# Patient Record
Sex: FEMALE | Race: WHITE
[De-identification: names, ages, dates, MRNs, and addresses within clinical notes are randomized per-mention and may not be internally consistent; named-entity substitution may affect disease eponyms.]

---

## 2018-02-20 ENCOUNTER — HOSPITAL ENCOUNTER (EMERGENCY)
Dept: HOSPITAL 80 - FED | Age: 18
Discharge: HOME | End: 2018-02-20
Payer: COMMERCIAL

## 2018-02-20 VITALS
RESPIRATION RATE: 18 BRPM | SYSTOLIC BLOOD PRESSURE: 127 MMHG | HEART RATE: 63 BPM | OXYGEN SATURATION: 100 % | DIASTOLIC BLOOD PRESSURE: 87 MMHG

## 2018-02-20 VITALS — TEMPERATURE: 98.4 F

## 2018-02-20 DIAGNOSIS — R06.00: Primary | ICD-10-CM

## 2018-02-20 DIAGNOSIS — F17.200: ICD-10-CM

## 2018-02-20 LAB — PLATELET # BLD: 346 10^3/UL (ref 150–400)

## 2018-02-20 NOTE — EDPHY
H & P


Stated Complaint: sob, cp


Time Seen by Provider: 02/20/18 14:03


HPI/ROS: 





CHIEF COMPLAINT: "I just can not catch my breath" x 12 hr





HISTORY OF PRESENT ILLNESS:  17-year-old female with self-described history of 

anxiety complaining of dyspnea for the past 12 hr as well as mild pleuritic 

right-sided chest pain after she returned home from work.  Positive birth 

control history.  No trauma.  No syncope or near syncope.  No cigarette use.  

No cocaine use.  No history of coagulopathic disorder.  No immobilization.  No 

malignancy history.  Headache.








REVIEW OF SYSTEMS:


A ten point review of systems was performed and is negative with the exception 

of the items mentioned in the HPI








PAST MEDICAL & SURGICAL  HISTORY:  Self-described anxiety history.





SOCIAL HISTORY:  Nonsmoker.  No drug use.  No cocaine use.











************


PHYSICAL EXAM





(Prior to examination, patient consented to physical exam, hands were washed 

and my usual and customary physical exam procedures followed)


1) GENERAL: Well-developed, well-nourished, alert and oriented.  Appears 

anxious.


2) HEAD: Normocephalic, atraumatic


3) HEENT: Pupils equal, round, reactive to light bilaterally.  Sclera 

anicteric.  Nasopharynx, oropharynx, clear, no lesions. 


4) NECK: Full range of motion, no meningeal signs.


5) LUNGS: Clear auscultation bilaterally, no wheezes, no rhonchi, no 

retractions.   


6) HEART: Regular rate and rhythm, no murmur, no heave, no gallop.


7) ABDOMEN: No guarding, no rebound, no focal tenderness, negative McBurney's, 

negative Dawn's, negative Rovsing's, negative peritoneal sign,


8) MUSCULOSKELETAL: Moving all extremities, no focal areas of tenderness, no 

obvious trauma.  No peripheral edema or discoloration.  Negative Homans no 

palpable cord


9) BACK: No CVA tenderness, no midline vertebral tenderness, no fluctuance, no 

step-off, no obvious trauma, no visual or palpable abnormality. 


10) SKIN: No rash, no petechiae. 


11) Psychiatric:  Patient is oriented X 3, there is no agitation.








***************





DIFFERENTIAL DIAGNOSIS:  In no particular order including but not limited to 

pneumothorax, pulmonary embolus, anxiety








- Personal History


LMP (Females 10-55): 15-21 Days Ago


Current Tetanus/Diphtheria Vaccine: Yes


Current Tetanus Diphtheria and Acellular Pertussis (TDAP): Yes





- Medical/Surgical History


Hx Asthma: No


Hx Chronic Respiratory Disease: No


Hx Diabetes: No


Hx Cardiac Disease: No


Hx Renal Disease: No


Hx Cirrhosis: No


Hx Alcoholism: No


Hx HIV/AIDS: No


Hx Splenectomy or Spleen Trauma: No


Other PMH: Denies





- Social History


Smoking Status: Current some day smoker


Constitutional: 


 Initial Vital Signs











Temperature (C)  36.9 C   02/20/18 13:59


 


Heart Rate  71   02/20/18 13:59


 


Respiratory Rate  16   02/20/18 13:59


 


Blood Pressure  127/78 H  02/20/18 13:59


 


O2 Sat (%)  95   02/20/18 13:59








 











O2 Delivery Mode               Room Air














Allergies/Adverse Reactions: 


 





cefprozil [From Cefzil] Allergy (Verified 02/20/18 13:58)


 








Home Medications: 














 Medication  Instructions  Recorded


 


Bcp  02/20/18


 


LORazepam [Ativan 1 mg (RX)] 1 mg PO Q6 PRN #3 tab 02/20/18














Medical Decision Making





- Diagnostics


Imaging Results: 


 Imaging Impressions





Chest X-Ray  02/20/18 14:09


Impression:  No acute findings in the chest.


 


 








Images reviewed myself


ED Course/Re-evaluation: 





2:10 p.m.:  Will obtain EKG, chest x-ray, check D-dimer and re-evaluate.





3:14 p.m.:  Re-evaluation.  Discussed her imaging results, discussed her normal 

sinus EKG, her normal D-dimer which I think adequately excludes pulmonary 

embolus in this patient whom I have a low to moderate suspicion for pulmonary 

embolus.  I do not think that the benefits of CT imaging outweigh the risks.  

Doubt pulmonary embolus.  Doubt pulmonary infectious etiology.  Doubt MI given 

her lack of risk factors.  Plan will be discharge home.  She requests 

anxiolytics which could be given to her.  Risks of benzodiazepine discussed 

with patient.  She feels comfortable with this plan.  Care of patient under 

supervision of secondary supervising physician Dr Kirk


 . 





- Data Points


Laboratory Results: 


 Laboratory Results





 02/20/18 14:23 





 02/20/18 14:23 





 











  02/20/18 02/20/18 02/20/18





  14:23 14:23 14:23


 


WBC      





    


 


RBC      





    


 


Hgb      





    


 


Hct      





    


 


MCV      





    


 


MCH      





    


 


MCHC      





    


 


RDW      





    


 


Plt Count      





    


 


MPV      





    


 


Neut % (Auto)      





    


 


Lymph % (Auto)      





    


 


Mono % (Auto)      





    


 


Eos % (Auto)      





    


 


Baso % (Auto)      





    


 


Nucleat RBC Rel Count      





    


 


Absolute Neuts (auto)      





    


 


Absolute Lymphs (auto)      





    


 


Absolute Monos (auto)      





    


 


Absolute Eos (auto)      





    


 


Absolute Basos (auto)      





    


 


Absolute Nucleated RBC      





    


 


Immature Gran %      





    


 


Immature Gran #      





    


 


D-Dimer      < 0.27 ug/mLFEU ug/mLFEU





     (0.00-0.50) 


 


Sodium    142 mEq/L mEq/L  





    (135-145)  


 


Potassium    4.0 mEq/L mEq/L  





    (3.5-5.2)  


 


Chloride    106 mEq/L mEq/L  





    ()  


 


Carbon Dioxide    22 mEq/l mEq/l  





    (22-31)  


 


Anion Gap    14 mEq/L mEq/L  





    (8-16)  


 


BUN    12 mg/dL mg/dL  





    (7-23)  


 


Creatinine    0.8 mg/dL mg/dL  





    (0.6-1.0)  


 


Estimated GFR    > 60   





    


 


Glucose    74 mg/dL mg/dL  





    ()  


 


Calcium    9.6 mg/dL mg/dL  





    (8.5-10.4)  


 


Beta HCG, Qual  NEGATIVE     





    














  02/20/18





  14:23


 


WBC  7.16 10^3/uL 10^3/uL





   (3.80-9.50) 


 


RBC  5.07 10^6/uL 10^6/uL





   (4.18-5.33) 


 


Hgb  15.7 g/dL g/dL





   (12.6-16.3) 


 


Hct  44.9 % %





   (38.0-47.0) 


 


MCV  88.6 fL fL





   (81.5-99.8) 


 


MCH  31.0 pg pg





   (27.9-34.1) 


 


MCHC  35.0 g/dL g/dL





   (32.4-36.7) 


 


RDW  11.9 % %





   (11.5-15.2) 


 


Plt Count  346 10^3/uL 10^3/uL





   (150-400) 


 


MPV  9.2 fL fL





   (8.7-11.7) 


 


Neut % (Auto)  55.3 % %





   (39.3-74.2) 


 


Lymph % (Auto)  33.5 % %





   (15.0-45.0) 


 


Mono % (Auto)  8.2 % %





   (4.5-13.0) 


 


Eos % (Auto)  2.1 % %





   (0.6-7.6) 


 


Baso % (Auto)  0.6 % %





   (0.3-1.7) 


 


Nucleat RBC Rel Count  0.0 % %





   (0.0-0.2) 


 


Absolute Neuts (auto)  3.96 10^3/uL 10^3/uL





   (1.70-6.50) 


 


Absolute Lymphs (auto)  2.40 10^3/uL 10^3/uL





   (1.00-3.00) 


 


Absolute Monos (auto)  0.59 10^3/uL 10^3/uL





   (0.30-0.80) 


 


Absolute Eos (auto)  0.15 10^3/uL 10^3/uL





   (0.03-0.40) 


 


Absolute Basos (auto)  0.04 10^3/uL 10^3/uL





   (0.02-0.10) 


 


Absolute Nucleated RBC  0.00 10^3/uL 10^3/uL





   (0-0.01) 


 


Immature Gran %  0.3 % %





   (0.0-1.1) 


 


Immature Gran #  0.02 10^3/uL 10^3/uL





   (0.00-0.10) 


 


D-Dimer  





  


 


Sodium  





  


 


Potassium  





  


 


Chloride  





  


 


Carbon Dioxide  





  


 


Anion Gap  





  


 


BUN  





  


 


Creatinine  





  


 


Estimated GFR  





  


 


Glucose  





  


 


Calcium  





  


 


Beta HCG, Qual  





  














Departure





- Departure


Disposition: Home, Routine, Self-Care


Clinical Impression: 


Dyspnea


Qualifiers:


 Dyspnea type: unspecified Qualified Code(s): R06.00 - Dyspnea, unspecified





Condition: Good


Instructions:  Dyspnea (ED)


Additional Instructions: 


Return to the ER if you develop shortness of breath, chest pain sensation of 

feeling faint or any other symptoms that concern you


Referrals: 


PHAN URIBE [Primary Care Provider] - 1-2 days without fail


Prescriptions: 


LORazepam [Ativan 1 mg (RX)] 1 mg PO Q6 PRN #3 tab


 PRN Reason: Anxiety

## 2018-02-20 NOTE — CPEKG
Heart Rate: 67

RR Interval: 896

P-R Interval: 136

QRSD Interval: 84

QT Interval: 404

QTC Interval: 427

P Axis: 8

QRS Axis: 49

T Wave Axis: -4

EKG Severity - NORMAL ECG -

EKG Impression: SINUS RHYTHM

Electronically Signed By: Fernandez Oliveros 22-Feb-2018 07:19:35

## 2022-08-17 ENCOUNTER — APPOINTMENT (RX ONLY)
Dept: URBAN - METROPOLITAN AREA CLINIC 9 | Facility: CLINIC | Age: 22
Setting detail: DERMATOLOGY
End: 2022-08-17

## 2022-08-17 DIAGNOSIS — B07.8 OTHER VIRAL WARTS: ICD-10-CM | Status: INADEQUATELY CONTROLLED

## 2022-08-17 PROCEDURE — ? COUNSELING

## 2022-08-17 PROCEDURE — ? BENIGN DESTRUCTION

## 2022-08-17 PROCEDURE — 17110 DESTRUCTION B9 LES UP TO 14: CPT

## 2022-08-17 ASSESSMENT — LOCATION DETAILED DESCRIPTION DERM: LOCATION DETAILED: LEFT INDEX FINGERTIP

## 2022-08-17 ASSESSMENT — LOCATION SIMPLE DESCRIPTION DERM: LOCATION SIMPLE: LEFT INDEX FINGER

## 2022-08-17 ASSESSMENT — LOCATION ZONE DERM: LOCATION ZONE: FINGER

## 2022-08-17 NOTE — PROCEDURE: BENIGN DESTRUCTION
Treatment Number (Will Not Render If 0): 1
Include Z78.9 (Other Specified Conditions Influencing Health Status) As An Associated Diagnosis?: No
Consent: The patient's consent was obtained including but not limited to risks of crusting, scabbing, blistering, scarring, darker or lighter pigmentary change, recurrence, incomplete removal and infection.
Duration Of Freeze Thaw-Cycle (Seconds): 5-10
Detail Level: Detailed
Medical Necessity Clause: This procedure was medically necessary because the lesions that were treated were:
Anesthesia Volume In Cc: 0.5
Medical Necessity Information: It is in your best interest to select a reason for this procedure from the list below. All of these items fulfill various CMS LCD requirements except the new and changing color options.
Post-Care Instructions: I reviewed with the patient in detail post-care instructions. Patient is to wear sunprotection, and avoid picking at any of the treated lesions. Pt may apply Vaseline to crusted or scabbing areas.
Number Of Freeze-Thaw Cycles: 3 freeze-thaw cycles

## 2024-01-19 ENCOUNTER — APPOINTMENT (RX ONLY)
Dept: URBAN - METROPOLITAN AREA CLINIC 9 | Facility: CLINIC | Age: 24
Setting detail: DERMATOLOGY
End: 2024-01-19

## 2024-01-19 DIAGNOSIS — L57.8 OTHER SKIN CHANGES DUE TO CHRONIC EXPOSURE TO NONIONIZING RADIATION: ICD-10-CM | Status: STABLE

## 2024-01-19 DIAGNOSIS — D18.0 HEMANGIOMA: ICD-10-CM

## 2024-01-19 DIAGNOSIS — L30.0 NUMMULAR DERMATITIS: ICD-10-CM | Status: INADEQUATELY CONTROLLED

## 2024-01-19 PROBLEM — D18.01 HEMANGIOMA OF SKIN AND SUBCUTANEOUS TISSUE: Status: ACTIVE | Noted: 2024-01-19

## 2024-01-19 PROCEDURE — ? PRESCRIPTION

## 2024-01-19 PROCEDURE — ? COSMETIC CONSULTATION - PULSED-DYE LASER

## 2024-01-19 PROCEDURE — ? FULL BODY SKIN EXAM - DECLINED

## 2024-01-19 PROCEDURE — 99213 OFFICE O/P EST LOW 20 MIN: CPT

## 2024-01-19 PROCEDURE — ? COUNSELING

## 2024-01-19 RX ORDER — FLUOCINONIDE 0.5 MG/G
CREAM TOPICAL BID
Qty: 60 | Refills: 1 | Status: ERX | COMMUNITY
Start: 2024-01-19

## 2024-01-19 RX ADMIN — FLUOCINONIDE: 0.5 CREAM TOPICAL at 00:00

## 2024-01-19 ASSESSMENT — LOCATION DETAILED DESCRIPTION DERM
LOCATION DETAILED: LEFT SUPERIOR LATERAL MALAR CHEEK
LOCATION DETAILED: RIGHT ANTERIOR PROXIMAL THIGH
LOCATION DETAILED: XIPHOID
LOCATION DETAILED: LEFT SUPERIOR UPPER BACK
LOCATION DETAILED: LEFT LATERAL ABDOMEN

## 2024-01-19 ASSESSMENT — LOCATION ZONE DERM
LOCATION ZONE: LEG
LOCATION ZONE: TRUNK
LOCATION ZONE: FACE

## 2024-01-19 ASSESSMENT — LOCATION SIMPLE DESCRIPTION DERM
LOCATION SIMPLE: RIGHT THIGH
LOCATION SIMPLE: ABDOMEN
LOCATION SIMPLE: LEFT CHEEK
LOCATION SIMPLE: LEFT UPPER BACK

## 2024-02-06 ENCOUNTER — APPOINTMENT (RX ONLY)
Dept: URBAN - METROPOLITAN AREA CLINIC 10 | Facility: CLINIC | Age: 24
Setting detail: DERMATOLOGY
End: 2024-02-06

## 2024-02-06 DIAGNOSIS — D18.0 HEMANGIOMA: ICD-10-CM

## 2024-02-06 PROBLEM — D18.01 HEMANGIOMA OF SKIN AND SUBCUTANEOUS TISSUE: Status: ACTIVE | Noted: 2024-02-06

## 2024-02-06 PROCEDURE — ? COUNSELING

## 2024-02-06 PROCEDURE — ? VBEAM PERFECTA LASER

## 2024-02-06 ASSESSMENT — LOCATION SIMPLE DESCRIPTION DERM: LOCATION SIMPLE: LEFT CHEEK

## 2024-02-06 ASSESSMENT — LOCATION ZONE DERM: LOCATION ZONE: FACE

## 2024-02-06 ASSESSMENT — LOCATION DETAILED DESCRIPTION DERM: LOCATION DETAILED: LEFT SUPERIOR LATERAL MALAR CHEEK

## 2024-02-06 NOTE — PROCEDURE: VBEAM PERFECTA LASER
Spray Time (Ms): 0
Fluence (J/Cm2): 9
Is There A Fourth Setting?: no
Treatment Number: 1
Spot Size: 3 mm
Price (Use Numbers Only, No Special Characters Or $): 100
Post-Care Instructions: I reviewed with the patient in detail post-care instructions.  Cool compresses or cold gel packs may be applied after treatment.  Exposure to the sun should be avoided and sun protection and sunscreen should be used.
Spot Size: 7 mm
Post-Procedure: Following the procedure the post care instructions were reviewed with the patient.
Pre-Procedure: The treatment areas identified by the patient were cleansed and treatment was performed with the parameters mentioned above.
Number Of Pulses: 4
Detail Level: Simple
Consent: Written consent obtained.  Risks were reviewed including but not limited to crusting, scabbing, blistering, scarring, darker or lighter pigmentary change, bruising, and/or incomplete response.
Pulse Duration: 1.5 ms
Number Of Pulses: 8
Pulse Duration: 40 ms
Detail Level: Zone